# Patient Record
Sex: MALE | Race: WHITE | NOT HISPANIC OR LATINO | Employment: UNEMPLOYED | URBAN - METROPOLITAN AREA
[De-identification: names, ages, dates, MRNs, and addresses within clinical notes are randomized per-mention and may not be internally consistent; named-entity substitution may affect disease eponyms.]

---

## 2017-01-01 PROBLEM — R79.89 LOW TSH LEVEL: Status: ACTIVE | Noted: 2017-01-01

## 2017-01-16 ENCOUNTER — ALLSCRIPTS OFFICE VISIT (OUTPATIENT)
Dept: OTHER | Facility: OTHER | Age: 27
End: 2017-01-16

## 2018-01-17 NOTE — MISCELLANEOUS
Assessment    1  Depression, major, single episode, moderate (296 22) (F32 1)   2  Anxiety and depression (300 00,311) (F41 9,F32 9)   3  Panic disorder without agoraphobia (300 01) (F41 0)   4  Tachycardia (785 0) (R00 0)   5  Low TSH level (794 5) (R94 6)    Plan  Anxiety and depression    · From  KlonoPIN 1 MG Oral Tablet TAKE 1 TABLET 3 times daily To  ClonazePAM 2 MG Oral Tablet 1 tab 9 AM, 1/2 tab at 2 PM, 1tab ( PM   Rx By: Semantriamarlen; Dispense: 0 Days ; #:90 Tablet; Refill: 1; For: Anxiety and depression; ZOE = N; Print Rx  Low TSH level, Tachycardia    · (1) FREE T3; Status:Active; Requested TGH:[id number];    Perform:[facility]; Due:16Jan2018; Ordered; For:Low TSH level, Tachycardia; Ordered By:Sawyer Ferrara;   · (1) T4, FREE; Status:Active; Requested OPN:[id number];    Perform:[facility]; Due:16Jan2018; Ordered; For:Low TSH level, Tachycardia; Ordered By:Jose Ferrara;   · (1) TSH; Status:Active; Requested XQR:[id number];    Perform:[facility]; Due:16Jan2018; Ordered; For:Low TSH level, Tachycardia; Ordered By:Jose Ferrara;    Discussion/Summary  Discussion Summary:   Continue discharge medications  Repeat TSH, Free T3, Free T4- 3-4 weeks  Chief Complaint  Chief Complaint Free Text Note Form: DELMI - PANIC/DEPRESSIVE DISORDER    Chief Complaint Chronic Condition [facility]: Patient is here today for follow up of chronic conditions described in HPI  History of Present Illness  TCM Communication [facility]: The patient is being contacted for follow-up after hospitalization  He was hospitalized at [address] [place] [address] Drive  The date of admission: 12/28/2016, date of discharge: 01/09/2017  Diagnosis: PANIC/DEPRESSIVE DISORDER  He was discharged to home  Medications reviewed and updated today  He scheduled a follow up appointment  Follow-up appointments with other specialists: PSYCH/THERAPIST 1/11/17  The patient is currently asymptomatic     Communication performed and completed by Mat Alvarez   HPI: Patient was admitted for depression and anxiety  He was related to her as  His initial hospitalization labs showed a very minimally lowered TSH however the free T4 and free T3 were normal  He was discharge and no medication  He does have a chronic diagnosis of tachycardia which he takes Lortab 10 mg 3 times a day  He denies any current palpitations or chest pains  His medications were adjusted during hospital stay  Review of Systems  Complete-Male:   Constitutional: no fever, not feeling poorly and no chills  Cardiovascular: No complaints of slow heart rate, no fast heart rate, no chest pain, no palpitations, no leg claudication, no lower extremity  Respiratory: No complaints of shortness of breath, no wheezing, no cough, no SOB on exertion, no orthopnea or PND  Psychiatric: as noted in HPI  Active Problems    1  Anxiety and depression (300 00,311) (F41 9,F32 9)   2  Depression, major, single episode, moderate (296 22) (F32 1)   3  Insomnia, controlled (780 52) (G47 00)   4  Panic disorder without agoraphobia (300 01) (F41 0)   5  Tachycardia (785 0) (R00 0)    Past Medical History    1  History of Annual physical exam (V70 0) (Z00 00)    Family History  Father    1  No pertinent family history  Family History Reviewed: The family history was reviewed and updated today  Social History    · Current every day smoker (305 1) (F17 200)  Social History Reviewed: The social history was reviewed and updated today  Current Meds   1  Invega Sustenna 234 MG/1 5ML Intramuscular Suspension; INJECT 1 5 ML Other; Therapy: [id number] to Recorded   2  KlonoPIN 1 MG Oral Tablet; TAKE 1 TABLET 3 times daily; Therapy: 37KQD0156 to (Evaluate:14Oct2016) Recorded   3  Pristiq 100 MG Oral Tablet Extended Release 24 Hour; Take 1 tablet daily; Therapy: [id number] to Recorded   4  Propranolol HCl - 10 MG Oral Tablet; TAKE 1 TABLET 3 TIMES DAILY; Therapy: [id number] to (Evaluate:13Mar2017); Last Rx:14Sep2016 Ordered   5  RisperiDONE 2 MG Oral Tablet; TAKE 1 TABLET TWICE DAILY; Therapy: 00VZQ5659 to Recorded   6  Vitamin D 2000 UNIT Oral Capsule; Take 1 capsule twice daily; Therapy: 58Ken8672 to (Last Rx:58Vth4703) Ordered  Medication List Reviewed: The medication list was reviewed and updated today  Allergies    1  No Known Drug Allergies    Vitals  Signs   Recorded: 82ZKC1192 03:01PM   Heart Rate: 114  Respiration: 16  Systolic: 630  Diastolic: 70  Height: 5 ft 6 in  Weight: 196 lb   BMI Calculated: 31 63  BSA Calculated: 1 99  O2 Saturation: 96    Physical Exam    Constitutional   General appearance: No acute distress, well appearing and well nourished  Eyes   Conjunctiva and lids: No swelling, erythema, or discharge  Pupils and irises: Equal, round and reactive to light  Ears, Nose, Mouth, and Throat   External inspection of ears and nose: Normal     Nasal mucosa, septum, and turbinates: Normal without edema or erythema  Oropharynx: Normal with no erythema, edema, exudate or lesions  Pulmonary   Respiratory effort: No increased work of breathing or signs of respiratory distress  Auscultation of lungs: Clear to auscultation, equal breath sounds bilaterally, no wheezes, no rales, no rhonci  Cardiovascular   Auscultation of heart: Normal rate and rhythm, normal S1 and S2, without murmurs  Examination of extremities for edema and/or varicosities: Normal     Carotid pulses: Normal     Lymphatic   Palpation of lymph nodes in neck: No lymphadenopathy  Psychiatric   Orientation to person, place and time: Normal     Mood and affect: Normal          Message   Recorded as Task   Date: 01/09/2017 05:15 PM, Created By: System   Task Name: [facility] DELMI   Assigned To: [address] [place], [place] [address]   Regarding Patient: Jose Rossi, Status:  In Progress   Comment:    System - 09 Jan 2017 5:15 PM     Patient discharged from hospital   Patient Name: Hodan Batista  Patient Date of Birth: 1990  Discharge Date: 1/9/2017  Facility: [facility] - 09 Jan 2017 6:13 PM     TASK REASSIGNED: Previously Assigned To Radhika Jung - 10 Quentin 2017 10:49 AM     TASK EDITED  LOOKS LIKE PATIENT IS LEAVING THIS WEEK TO LIVE IN [place] WITH HIS FATHER    MSG LEFT FOR PT TO CB     Signatures   Electronically signed by : Camilla Terry MD; Jan 16 2017  4:58PM EST                       (Author)

## 2018-01-22 VITALS
HEIGHT: 66 IN | RESPIRATION RATE: 16 BRPM | DIASTOLIC BLOOD PRESSURE: 70 MMHG | BODY MASS INDEX: 31.5 KG/M2 | HEART RATE: 114 BPM | WEIGHT: 196 LBS | OXYGEN SATURATION: 96 % | SYSTOLIC BLOOD PRESSURE: 110 MMHG